# Patient Record
Sex: MALE | Race: OTHER | HISPANIC OR LATINO | URBAN - METROPOLITAN AREA
[De-identification: names, ages, dates, MRNs, and addresses within clinical notes are randomized per-mention and may not be internally consistent; named-entity substitution may affect disease eponyms.]

---
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
## 2024-01-11 NOTE — ED PROVIDER NOTE - NS ED NOTE AC HIGH RISK COUNTRIES
Medication: atorvastatin 40 mg  Last office visit date: 07-  Next appointment scheduled?: No;  Return in about 3 months (around 10/20/2023) for [address] with fasting labs 3-5 days prior. Number of refills given: 0    Writer contacted patient, she is moving and will be establishing care with a new provider. Refills denied. No further questions or concerns.
No

## 2024-09-12 NOTE — ED PROVIDER NOTE - NS ED MD EM SELECTION
Rx Refill Note  Requested Prescriptions     Pending Prescriptions Disp Refills    escitalopram (LEXAPRO) 20 MG tablet [Pharmacy Med Name: Escitalopram Oxalate 20 MG Oral Tablet] 90 tablet 0     Sig: Take 1 tablet by mouth once daily      Last office visit with prescribing clinician: 9/6/2024   Last telemedicine visit with prescribing clinician: Visit date not found   Next office visit with prescribing clinician: 3/6/2025                         Would you like a call back once the refill request has been completed: [] Yes [] No    If the office needs to give you a call back, can they leave a voicemail: [] Yes [] No    Rubina Cash RN  09/12/24, 07:10 CDT    
71819 Wound Check/Suture Removal (no E&M)

## 2025-05-29 ENCOUNTER — EMERGENCY (EMERGENCY)
Facility: HOSPITAL | Age: 20
LOS: 1 days | End: 2025-05-29
Attending: EMERGENCY MEDICINE
Payer: MEDICAID

## 2025-05-29 VITALS
SYSTOLIC BLOOD PRESSURE: 123 MMHG | HEART RATE: 102 BPM | TEMPERATURE: 98 F | DIASTOLIC BLOOD PRESSURE: 87 MMHG | WEIGHT: 211.64 LBS | RESPIRATION RATE: 20 BRPM | OXYGEN SATURATION: 98 % | HEIGHT: 67 IN

## 2025-05-29 VITALS — HEART RATE: 79 BPM

## 2025-05-29 PROCEDURE — 87081 CULTURE SCREEN ONLY: CPT

## 2025-05-29 PROCEDURE — 99283 EMERGENCY DEPT VISIT LOW MDM: CPT

## 2025-05-29 NOTE — ED PROVIDER NOTE - THROAT FINDINGS
Bilateral tonsillolith–minimal tonsillar erythema.  No exudates.  No peritonsillar swelling.  Uvula midline without edema.  No uvular deviation.  No trismus.

## 2025-05-29 NOTE — ED PROVIDER NOTE - PATIENT PORTAL LINK FT
You can access the FollowMyHealth Patient Portal offered by [facility] by registering at the following website: [email or web address]. By joining [facility]’s FollowMyHealth portal, you will also be able to view your health information using other applications (apps) compatible with our system.

## 2025-05-29 NOTE — ED PROVIDER NOTE - NSFOLLOWUPINSTRUCTIONS_ED_ALL_ED_FT
At home you can do warm water with salt gargle multiple times per day.  Follow-up with an ENT within 1 week.  Come back to the emergency room if you start having fever, difficulty opening her mouth, neck stiffness, voice changes, difficulty swallowing or any other concerning symptoms to you.    If you experience any new or worsening symptoms or if you are concerned you can always come back to the emergency for a re-evaluation.  Some results may not be available at the time of your discharge from the hospital. You can download the [facility]iane and have access to these results. Hydration: Drink plenty of fluids, such as water, to keep your throat moist and aid in healing. Avoid acidic or citrusy drinks, which can irritate your throat.  Soft Foods: Stick to a soft food diet for a few days to avoid irritating your throat. Good options include yogurt, soup, mashed potatoes, and scrambled eggs. Avoid crunchy, spicy, or acidic foods.  Oral Hygiene: Maintain good oral hygiene by gently brushing your teeth and tongue twice a day. Rinsing your mouth with warm salt water (1/2 teaspoon salt in 8 ounces of water) several times a day can also help soothe your throat and promote healing. Use a gentle approach to avoid further irritation. Your doctor may recommend avoiding mouthwash containing alcohol.  Pain Management: Take over-the-counter pain relievers as directed by your doctor or dentist. Do not exceed the recommended dosage.  Follow-up: Attend any scheduled follow-up appointments with your doctor or dentist to monitor your healing progress.    At home you can do warm water with salt gargle multiple times per day.  Follow-up with an ENT within 1 week.  Come back to the emergency room if you start having fever, difficulty opening her mouth, neck stiffness, voice changes, difficulty swallowing or any other concerning symptoms to you.    If you experience any new or worsening symptoms or if you are concerned you can always come back to the emergency for a re-evaluation.  Some results may not be available at the time of your discharge from the hospital. You can download the [facility] christ[facility] and have access to these results.

## 2025-05-29 NOTE — ED PROVIDER NOTE - OBJECTIVE STATEMENT
19-year-old male, no significant past medical history, presents for evaluation of itchy throat for ~1 week and now mild pain when coughing.  Noticed mild intermittent dry cough 5 days ago.   At the same time noticed the itching sensation in the throat.  No specific aggravating factors.  Denies any worsening of symptoms at night or after food intake.  Denies any associated postnasal drip congestion or other symptoms of seasonal allergies.  Denies any fever, swollen lymph nodes, neck stiffness, voice changes, difficulty breathing, difficulty swallowing or talking or any other complaints.

## 2025-05-29 NOTE — ED PROVIDER NOTE - CLINICAL SUMMARY MEDICAL DECISION MAKING FREE TEXT BOX
19-year-old male, presents with mild dry cough and itchy throat for 1 week.  Well-appearing, mildly tachycardic triage, afebrile.  On exam patient has bilateral tonsillolith.  No signs of PTA, RPA or epiglottitis.  Centor score 1.  Lower clinical suspicion for tonsillitis.  Will wait for strep results to decide if need for antibiotic.  Discussed home care regarding the tonsil stones and will refer outpatient to ENT for follow-up.

## 2025-05-30 LAB
CULTURE RESULTS: SIGNIFICANT CHANGE UP
SPECIMEN SOURCE: SIGNIFICANT CHANGE UP